# Patient Record
(demographics unavailable — no encounter records)

---

## 2025-06-27 NOTE — DISCUSSION/SUMMARY
[de-identified] : We discussed further treatment options. He has some cervical radiculopathy as well as right shoulder arthritis. He will see one of my colleagues for his shoulder. For his Cervical spine, I have recommended a course of physical therapy. Follow-up 6 weeks.
denies

## 2025-06-27 NOTE — PHYSICAL EXAM
[de-identified] : Examination of the cervical spine reveals no midline or paraspinal tenderness to palpation. No cervical lymphadenopathy. Decreased range of motion with respect to flexion, extension, rotation, and lateral bending. Negative Spurlings. Negative Lhermitte's.  He exhibits some decreased range of motion of his right shoulder with evidence of impingement. No instability of bilateral upper extremities.  Cranial nerves II through XII grossly intact. Intact sensation bilateral upper extremities. 5/5 deltoids biceps triceps wrist extensors wrist flexors finger flexors and hand intrinsics. 1+ biceps triceps and brachioradialis reflexes. Negative Brooke's. 2+ radial pulse. Negative Tinel's over the cubital and carpal tunnel. No skin lesions on the right and left upper extremities. [de-identified] : AP lateral cervical x-ray with C6-7 spondylosis.

## 2025-06-27 NOTE — HISTORY OF PRESENT ILLNESS
[de-identified] : Mr. PETE RAE  is a 65 year old male who presents with 2-3 months of neck and right shoulder pain.  The past few weeks he has had right arm tingling and a sensation of electric shocks in his arm.  He went to the ER and has been taking methocarbamol and ibuprofen for symptom control.  His symptoms are  the same.  Normal bowel and bladder control.   Denies any recent fevers, chills, sweats, weight loss, or infection.  The patients past medical history, past surgical history, medications, allergies, and social history were reviewed by me today with the patient and documented accordingly.  In addition, the patient's family history, which is noncontributory to their visit, was also reviewed.